# Patient Record
Sex: MALE | Race: WHITE
[De-identification: names, ages, dates, MRNs, and addresses within clinical notes are randomized per-mention and may not be internally consistent; named-entity substitution may affect disease eponyms.]

---

## 2017-07-17 ENCOUNTER — HOSPITAL ENCOUNTER (EMERGENCY)
Dept: HOSPITAL 25 - UCCORT | Age: 37
Discharge: HOME | End: 2017-07-17
Payer: COMMERCIAL

## 2017-07-17 VITALS — SYSTOLIC BLOOD PRESSURE: 112 MMHG | DIASTOLIC BLOOD PRESSURE: 70 MMHG

## 2017-07-17 DIAGNOSIS — Z87.891: ICD-10-CM

## 2017-07-17 DIAGNOSIS — J03.90: Primary | ICD-10-CM

## 2017-07-17 PROCEDURE — G0463 HOSPITAL OUTPT CLINIC VISIT: HCPCS

## 2017-07-17 PROCEDURE — 99212 OFFICE O/P EST SF 10 MIN: CPT

## 2017-07-17 PROCEDURE — 87651 STREP A DNA AMP PROBE: CPT

## 2017-07-17 NOTE — UC
Throat Pain/Nasal David HPI





- HPI Summary


HPI Summary: 





complaint of sore throat that started approx 3 days ago


intermittetn headache


 swollen glands in heis neck


 thinks he might have had a fever and has had some chills


denies nasalc ongestion, cough N/V/D


 taking ibuprofen with some relief








- History of Current Complaint


Chief Complaint: UCRespiratory


Stated Complaint: SORE THROAT


Time Seen by Provider: 07/17/17 11:46


Hx Obtained From: Patient





- Allergies/Home Medications


Allergies/Adverse Reactions: 


 Allergies











Allergy/AdvReac Type Severity Reaction Status Date / Time


 


No Known Allergies Allergy   Verified 07/17/17 11:54














PMH/Surg Hx/FS Hx/Imm Hx


Previously Healthy: Yes


Other History Of: 


   Negative For: HIV, Hepatitis B, Hepatitis C, Anticoagulant Therapy





- Surgical History


Surgical History: None





- Family History


Known Family History: Positive: Hypertension, Diabetes, Other - both mom and 

dad with small cell CA of lung


   Negative: Cardiac Disease





- Social History


Occupation: Employed Full-time


Lives: With Family


Alcohol Use: Rare


Alcohol Amount: 3 BEERS A DAY


Substance Use Type: None


Smoking Status (MU): Former Smoker


Have You Smoked in the Last Year: No





- Immunization History


Most Recent Influenza Vaccination: 2015


Most Recent Tetanus Shot: 2015





Review of Systems


Constitutional: Fever, Chills


Skin: Negative


Eyes: Negative


ENT: Sore Throat


Respiratory: Negative


Cardiovascular: Negative


Gastrointestinal: Negative


Genitourinary: Negative


Motor: Negative


Neurovascular: Negative


Musculoskeletal: Negative


Neurological: Headache


Psychological: Negative


All Other Systems Reviewed And Are Negative: Yes





Physical Exam


Triage Information Reviewed: Yes


Appearance: No Pain Distress, Well-Nourished


Vital Signs: 


 Initial Vital Signs











Temp  97.5 F   07/17/17 11:51


 


Pulse  72   07/17/17 11:51


 


Resp  14   07/17/17 11:51


 


BP  112/70   07/17/17 11:51


 


Pulse Ox  99   07/17/17 11:51











Vital Signs Reviewed: Yes


Eyes: Positive: Conjunctiva Clear


ENT: Positive: Pharyngeal erythema, Tonsillar swelling - 4+, Tonsillar exudate.

  Negative: Nasal congestion, Nasal drainage


Dental: Positive: Cervical Lymphadenopathy


Respiratory: Positive: Lungs clear, Normal breath sounds, No respiratory 

distress, No accessory muscle use


Cardiovascular: Positive: RRR, No Murmur, Pulses Normal


Abdomen Description: Positive: Nontender, Soft


Bowel Sounds: Positive: Present


Musculoskeletal: Positive: No Edema


Neurological: Positive: Alert


Psychological Exam: Normal


Skin Exam: Normal





Throat Pain/Nasal Course/Dx





- Course


Course Of Treatment: exam completed.  d/t extent of swellingand exudate of 

tonsils and fever , will treat for tonsillitis- throat culture pending, will 

followup with PCP





- Differential Dx/Diagnosis


Differential Diagnosis/HQI/PQRI: Pharyngitis, Tonsillitis


Provider Diagnoses: tonsilitis





Discharge





- Discharge Plan


Condition: Stable


Disposition: HOME


Prescriptions: 


Penicillin  MG TAB(NF) [Penicillin  mg Tab] 500 mg PO BID #20 tab


Patient Education Materials:  Tonsillitis (ED)


Referrals: 


No Primary Care Phys,NOPCP [Primary Care Provider] - 


[facility] [place] PHYSICIAN REFERRAL [Outside]


Additional Instructions: 


Please start antibiotic as directed


Increase fluids and rest


Take acetaminophen or ibuprofen for fever or pain


Please review your discharge instructions. 


If your symptoms do not improve please call your primary care provider or 

return to urgent care.





TONSILLITIS 


What is Tonsillitis? 


Tonsillitis is an infectious condition with symptoms characterized by inflamed 

tonsils, fever, painful swallowing, sore throat, and a slight voice change. 

Other symptoms include a white or yellow coating on the tonsils, swollen lymph 

nodes, headache, and bad breath. Nausea, vomiting, and abdominal pain may occur 

in younger children. Throat infection (pharyngitis) often occurs along with 

tonsillitis. 


Tonsillitis may be caused by either viruses or bacteria, and often the symptoms 

are the same no matter which germ is causing the infection. Bacterial 

tonsillitis can be treated with antibiotics, but viral tonsillitis cannot. 

Sometimes healthcare providers differentiate between the two by taking a throat 

culture (a painless swab of the back of the throat) or a quick step test and 

send it to the lab. Eighty-five percent of throat cultures are negative for 

strep; the majority of infections are caused by a virus. 


Symptoms Might Include: 


 Mild to severe sore throat and difficulty swallowing 


 Fever 


 Swollen, tender neck glands 


 Headache 


 Muscle and joint pain 


 Loss of appetite 


 Ear ache 


 Breathing through the mouth 


 Nausea or vomiting 





How Long Will My Symptoms Last? 


When tonsillitis is caused by Group A streptococci, fever usually stops within 

48 hours, and the sore throat disappears soon afterward. With antibiotic 

treatment, the illness is usually cured within 1 week, but it may take several 

weeks for the tonsils and swollen glands to return to normal size. 


When tonsillitis is caused by viruses, the length of illness depends on which 

virus is involved. Most people are almost completely recovered within 1 week. 


Contagiousness: 


All forms of tonsillitis are contagious. Tonsillitis usually spreads from 

person to person by contact with the throat or nasal fluids of someone who is 

already infected. Drinking glasses and eating utensils should be kept separate 

from those of other family members and should be washed with hot soapy water, 

or washed in the . 


All family members should was their hand frequently. If you have a cough, be 

sure to cover your mouth when coughing. Place used tissues directly in the 

garbage can. 





.

## 2018-03-18 ENCOUNTER — HOSPITAL ENCOUNTER (EMERGENCY)
Dept: HOSPITAL 25 - UCCORT | Age: 38
Discharge: HOME | End: 2018-03-18
Payer: COMMERCIAL

## 2018-03-18 VITALS — SYSTOLIC BLOOD PRESSURE: 136 MMHG | DIASTOLIC BLOOD PRESSURE: 82 MMHG

## 2018-03-18 DIAGNOSIS — J32.9: Primary | ICD-10-CM

## 2018-03-18 DIAGNOSIS — Z87.891: ICD-10-CM

## 2018-03-18 PROCEDURE — 99212 OFFICE O/P EST SF 10 MIN: CPT

## 2018-03-18 PROCEDURE — G0463 HOSPITAL OUTPT CLINIC VISIT: HCPCS

## 2018-03-18 NOTE — UC
Throat Pain/Nasal David HPI





- HPI Summary


HPI Summary: 





patient has had increased sinus pressure and sore throat over the past week.





- History of Current Complaint


Chief Complaint: UCRespiratory


Stated Complaint: SINUSES, SORE THROAT


Time Seen by Provider: 03/18/18 11:29


Hx Obtained From: Patient


Onset/Duration: Sudden Onset, Lasting Weeks - 1


Severity: Moderate


Pain Intensity: 5


Associated Signs & Symptoms: Positive: Dysphagia, Sinus Discomfort, Nasal 

Discharge





- Allergies/Home Medications


Allergies/Adverse Reactions: 


 Allergies











Allergy/AdvReac Type Severity Reaction Status Date / Time


 


No Known Allergies Allergy   Verified 03/18/18 10:55














PMH/Surg Hx/FS Hx/Imm Hx


Previously Healthy: Yes


Other History Of: 


   Negative For: HIV, Hepatitis B, Hepatitis C, Anticoagulant Therapy





- Surgical History


Surgical History: None





- Family History


Known Family History: Positive: Hypertension, Diabetes, Other - both mom and 

dad with small cell CA of lung


   Negative: Cardiac Disease





- Social History


Alcohol Use: None


Alcohol Amount: 3 BEERS A DAY


Substance Use Type: None


Smoking Status (MU): Former Smoker


Have You Smoked in the Last Year: No





- Immunization History


Most Recent Influenza Vaccination: 2015


Most Recent Tetanus Shot: 2015





Review of Systems


Constitutional: Negative


Skin: Negative


Eyes: Negative


ENT: Sore Throat, Ear Ache, Nasal Discharge, Sinus Congestion, Sinus Pain/

Tenderness


Respiratory: Negative


Cardiovascular: Negative


Gastrointestinal: Negative


Genitourinary: Negative


Motor: Negative


Neurovascular: Negative


Musculoskeletal: Negative


Neurological: Negative


Psychological: Negative


Is Patient Immunocompromised?: No


All Other Systems Reviewed And Are Negative: Yes





Physical Exam


Triage Information Reviewed: Yes


Appearance: Well-Appearing, Well-Nourished, Ill-Appearing


Vital Signs: 


 Initial Vital Signs











Temp  97.8 F   03/18/18 10:54


 


Pulse  68   03/18/18 10:54


 


Resp  15   03/18/18 10:54


 


BP  136/82   03/18/18 10:54


 


Pulse Ox  99   03/18/18 10:54











Vital Signs Reviewed: Yes


Eye Exam: Normal


ENT: Positive: Pharyngeal erythema, Nasal congestion, Nasal drainage, Sinus 

tenderness


Dental Exam: Normal


Neck exam: Normal


Neck: Positive: Supple, Nontender, No Lymphadenopathy


Respiratory Exam: Normal


Respiratory: Positive: Chest non-tender, Lungs clear, Normal breath sounds


Cardiovascular Exam: Normal


Cardiovascular: Positive: RRR, No Murmur, Pulses Normal


Abdominal Exam: Normal


Abdomen Description: Positive: Nontender, No Organomegaly, Soft


Bowel Sounds: Positive: Present


Musculoskeletal Exam: Normal


Musculoskeletal: Positive: Strength Intact, ROM Intact, No Edema


Neurological Exam: Normal


Psychological Exam: Normal


Skin Exam: Normal





Throat Pain/Nasal Course/Dx





- Course


Course Of Treatment: hx obtained, exam performed ,meds reviewed, rapid strep is 

neg, treated for sinusitis





- Differential Dx/Diagnosis


Differential Diagnosis/HQI/PQRI: Otitis Media, Pharyngitis, Sinusitis, URI


Provider Diagnoses: sinusitis





Discharge





- Discharge Plan


Condition: Stable


Disposition: HOME


Prescriptions: 


Azithromycin TAB* [Zithromax TAB (Z-DADA) 250 mg #6 tabs] 2 tab PO .TODAY, THEN 

1 DAILY #1 dada


Patient Education Materials:  Rhinosinusitis (ED)


Referrals: 


Meenu Galo MD [Primary Care Provider] - 


Additional Instructions: 


1. take the medication as prescribed.


2. Increase fluid intake and get plenty of rest.

## 2018-07-14 ENCOUNTER — HOSPITAL ENCOUNTER (EMERGENCY)
Dept: HOSPITAL 25 - UCCORT | Age: 38
Discharge: HOME | End: 2018-07-14
Payer: COMMERCIAL

## 2018-07-14 VITALS — SYSTOLIC BLOOD PRESSURE: 125 MMHG | DIASTOLIC BLOOD PRESSURE: 84 MMHG

## 2018-07-14 DIAGNOSIS — J02.9: ICD-10-CM

## 2018-07-14 DIAGNOSIS — Z87.891: ICD-10-CM

## 2018-07-14 DIAGNOSIS — H66.92: Primary | ICD-10-CM

## 2018-07-14 DIAGNOSIS — R05: ICD-10-CM

## 2018-07-14 PROCEDURE — 99212 OFFICE O/P EST SF 10 MIN: CPT

## 2018-07-14 PROCEDURE — G0463 HOSPITAL OUTPT CLINIC VISIT: HCPCS

## 2018-07-14 NOTE — UC
Throat Pain/Nasal David HPI





- HPI Summary


HPI Summary: 





38 F with cough . SORE THROAT X4 DAYS. BOTH EARS FEEL LIKE THEY NEED TO POP. 

LEFT EAR IS WORSE. WHEN HE LIES DOWN HE CONSTANTLY COUGHS. PRODUCTIVE COUGH FOR 

YELLOW MUCOUS. THOUGHT IT WAS ALLERGIES BUT IT HAS NOT GONE AWAY. 


[ End ] 





- History of Current Complaint


Stated Complaint: SORE THROAT,COUGH


Time Seen by Provider: 07/14/18 11:26


Hx Obtained From: Patient


Onset/Duration: Gradual Onset


Severity: Moderate


Cough: Productive


Associated Signs & Symptoms: Positive: Sinus Discomfort, Nasal Discharge





- Allergies/Home Medications


Allergies/Adverse Reactions: 


 Allergies











Allergy/AdvReac Type Severity Reaction Status Date / Time


 


No Known Allergies Allergy   Verified 07/14/18 11:33











Home Medications: 


 Home Medications





Fexofenadine/Pseudoephedrine [Allegra-D 24 Hour Tablet] 1 tab PO DAILY PRN 07/14 /18 [History Confirmed 07/14/18]











PMH/Surg Hx/FS Hx/Imm Hx


Previously Healthy: Yes


Other History Of: 


   Negative For: HIV, Hepatitis B, Hepatitis C, Anticoagulant Therapy





- Surgical History


Surgical History: None





- Family History


Known Family History: Positive: Hypertension, Diabetes, Other - both mom and 

dad with small cell CA of lung


   Negative: Cardiac Disease





- Social History


Occupation: Employed Full-time


Lives: With Family


Alcohol Use: None


Alcohol Amount: 3 BEERS A DAY


Substance Use Type: None


Smoking Status (MU): Former Smoker


Have You Smoked in the Last Year: No





- Immunization History


Most Recent Influenza Vaccination: 2015


Most Recent Tetanus Shot: 2015





Review of Systems


Constitutional: Chills, Fatigue


ENT: Sore Throat, Ear Ache, Nasal Discharge, Sinus Congestion


Respiratory: Cough


Is Patient Immunocompromised?: No


All Other Systems Reviewed And Are Negative: Yes





Physical Exam


Triage Information Reviewed: Yes


Appearance: Well-Appearing, No Pain Distress, Well-Nourished


Vital Signs Reviewed: Yes


Eye Exam: Normal


ENT Exam: Normal


ENT: Positive: TM bulging, TM dull, TM red - left


Dental Exam: Normal


Neck exam: Normal


Neck: Positive: 1


Respiratory Exam: Normal


Cardiovascular Exam: Normal


Musculoskeletal Exam: Normal


Neurological Exam: Normal


Psychological Exam: Normal


Skin Exam: Normal





Throat Pain/Nasal Course/Dx





- Differential Dx/Diagnosis


Differential Diagnosis/HQI/PQRI: Otitis Media, Pharyngitis, Sinusitis, URI


Provider Diagnoses: Left AOM





Discharge





- Sign-Out/Discharge


Documenting (check all that apply): Patient Departure





- Discharge Plan


Condition: Good


Disposition: HOME


Prescriptions: 


Amoxicillin PO (*) [Amoxicillin 875 MG (*)] 875 mg PO BID 10 Days #20 tab


Patient Education Materials:  Ear Infection (ED)


Referrals: 


Meenu Galo MD [Primary Care Provider] - 4 Days (if needed)





- Billing Disposition and Condition


Condition: GOOD


Disposition: Home